# Patient Record
Sex: MALE | Race: WHITE
[De-identification: names, ages, dates, MRNs, and addresses within clinical notes are randomized per-mention and may not be internally consistent; named-entity substitution may affect disease eponyms.]

---

## 2019-09-19 ENCOUNTER — NON-APPOINTMENT (OUTPATIENT)
Age: 64
End: 2019-09-19

## 2019-09-19 ENCOUNTER — APPOINTMENT (OUTPATIENT)
Dept: CARDIOLOGY | Facility: CLINIC | Age: 64
End: 2019-09-19
Payer: MEDICARE

## 2019-09-19 VITALS — SYSTOLIC BLOOD PRESSURE: 174 MMHG | DIASTOLIC BLOOD PRESSURE: 70 MMHG

## 2019-09-19 VITALS — SYSTOLIC BLOOD PRESSURE: 152 MMHG | DIASTOLIC BLOOD PRESSURE: 80 MMHG

## 2019-09-19 VITALS
SYSTOLIC BLOOD PRESSURE: 170 MMHG | HEIGHT: 73 IN | DIASTOLIC BLOOD PRESSURE: 70 MMHG | OXYGEN SATURATION: 98 % | HEART RATE: 70 BPM | BODY MASS INDEX: 26.37 KG/M2 | WEIGHT: 199 LBS

## 2019-09-19 DIAGNOSIS — Z87.891 PERSONAL HISTORY OF NICOTINE DEPENDENCE: ICD-10-CM

## 2019-09-19 DIAGNOSIS — Z82.49 FAMILY HISTORY OF ISCHEMIC HEART DISEASE AND OTHER DISEASES OF THE CIRCULATORY SYSTEM: ICD-10-CM

## 2019-09-19 DIAGNOSIS — K26.4 CHRONIC OR UNSPECIFIED DUODENAL ULCER WITH HEMORRHAGE: ICD-10-CM

## 2019-09-19 PROCEDURE — 93000 ELECTROCARDIOGRAM COMPLETE: CPT

## 2019-09-19 PROCEDURE — 99205 OFFICE O/P NEW HI 60 MIN: CPT

## 2019-09-19 RX ORDER — TAMSULOSIN HYDROCHLORIDE 0.4 MG/1
0.4 CAPSULE ORAL DAILY
Qty: 90 | Refills: 3 | Status: ACTIVE | COMMUNITY

## 2019-09-19 RX ORDER — ATORVASTATIN CALCIUM 20 MG/1
20 TABLET, FILM COATED ORAL
Qty: 90 | Refills: 3 | Status: ACTIVE | COMMUNITY

## 2019-09-19 RX ORDER — LOSARTAN POTASSIUM AND HYDROCHLOROTHIAZIDE 25; 100 MG/1; MG/1
100-25 TABLET ORAL DAILY
Qty: 90 | Refills: 3 | Status: ACTIVE | COMMUNITY

## 2019-09-19 RX ORDER — METOPROLOL SUCCINATE 50 MG/1
50 TABLET, EXTENDED RELEASE ORAL
Qty: 90 | Refills: 2 | Status: ACTIVE | COMMUNITY

## 2019-09-19 RX ORDER — ASPIRIN 81 MG
81 TABLET, DELAYED RELEASE (ENTERIC COATED) ORAL
Refills: 0 | Status: ACTIVE | COMMUNITY

## 2019-09-19 RX ORDER — OMEPRAZOLE 40 MG/1
40 CAPSULE, DELAYED RELEASE ORAL
Refills: 0 | Status: ACTIVE | COMMUNITY

## 2019-09-19 RX ORDER — AMLODIPINE BESYLATE 10 MG/1
10 TABLET ORAL
Qty: 90 | Refills: 2 | Status: ACTIVE | COMMUNITY

## 2019-09-19 NOTE — PHYSICAL EXAM
[General Appearance - Well Developed] : well developed [Normal Appearance] : normal appearance [Well Groomed] : well groomed [General Appearance - Well Nourished] : well nourished [No Deformities] : no deformities [General Appearance - In No Acute Distress] : no acute distress [Normal Conjunctiva] : the conjunctiva exhibited no abnormalities [Eyelids - No Xanthelasma] : the eyelids demonstrated no xanthelasmas [Normal Oral Mucosa] : normal oral mucosa [No Oral Pallor] : no oral pallor [No Oral Cyanosis] : no oral cyanosis [Normal Jugular Venous A Waves Present] : normal jugular venous A waves present [Normal Jugular Venous V Waves Present] : normal jugular venous V waves present [No Jugular Venous Ford A Waves] : no jugular venous frod A waves [Heart Rate And Rhythm] : heart rate and rhythm were normal [Heart Sounds] : normal S1 and S2 [Murmurs] : no murmurs present [Edema] : no peripheral edema present [Right Carotid Bruit] : no bruit heard over the right carotid [Left Carotid Bruit] : no bruit heard over the left carotid [1+] : left 1+ [Bruit] : no bruit heard [Respiration, Rhythm And Depth] : normal respiratory rhythm and effort [Exaggerated Use Of Accessory Muscles For Inspiration] : no accessory muscle use [Auscultation Breath Sounds / Voice Sounds] : lungs were clear to auscultation bilaterally [Bowel Sounds] : normal bowel sounds [Abdomen Soft] : soft [Abdomen Tenderness] : non-tender [Abnormal Walk] : normal gait [Gait - Sufficient For Exercise Testing] : the gait was sufficient for exercise testing [Nail Clubbing] : no clubbing of the fingernails [Cyanosis, Localized] : no localized cyanosis [Petechial Hemorrhages (___cm)] : no petechial hemorrhages [Skin Color & Pigmentation] : normal skin color and pigmentation [] : no rash [No Venous Stasis] : no venous stasis [Skin Lesions] : no skin lesions [No Skin Ulcers] : no skin ulcer [No Xanthoma] : no  xanthoma was observed [Oriented To Time, Place, And Person] : oriented to person, place, and time [Affect] : the affect was normal [Mood] : the mood was normal [Memory Recent] : recent memory was not impaired [No Anxiety] : not feeling anxious

## 2019-09-19 NOTE — REASON FOR VISIT
[Initial Evaluation] : an initial evaluation of [Abnormal ECG] : an abnormal ECG [Coronary Artery Disease] : coronary artery disease [Hyperlipidemia] : hyperlipidemia [Hypertension] : hypertension

## 2019-09-19 NOTE — HISTORY OF PRESENT ILLNESS
[FreeTextEntry1] : Hernán is a pleasant 64-year-old gentleman with history of hypertension, BPH, acute coronary syndrome status post medicated drug stent to his RCA placed in 2012, type 2 diabetes with improved blood sugar values eating healthy and feeling generally well comes over to establish cardiovascular care. He was previously seen by cardiologist in [facility] and reportedly last round of cardiac testing occurred in the November 2018 reportedly within acceptable limits. He has an element of white collar hypertension but generally his blood pressures at home he reports around 148 mmHg systolic BP. No current chest complaints feeling generally fine.

## 2019-09-19 NOTE — DISCUSSION/SUMMARY
[___ Month(s)] : [unfilled] month(s) [FreeTextEntry1] : I have made no changes to his medication regimen and he'll continue on aspirin daily for heart risk reduction along with his current antihypertensive and lipid-lowering strategies. He will remain on diltiazem, amlodipine, losartan/HCTZ and metoprolol for blood pressure control along with atorvastatin for lipid-lowering. I asked that he sign of medical release to have prior testing sent over. Recent labs are reviewed and his hemoglobin A1c appears at goal along with his fasting lipid profile. He is motivated to do have improved hemoglobin A1c still and hopefully lower his dose of metformin. He will start a exercise regimen shortly. I recommended a heart healthy lifestyle including a low-saturated fat, low cholesterol diet with improved aerobic physical fitness over time for cardiovascular benefits. Carbohydrate restriction and weight management over time also encouraged. He will see you for care and followup with me in about 6 months or sooner if needed.

## 2020-03-19 ENCOUNTER — APPOINTMENT (OUTPATIENT)
Dept: CARDIOLOGY | Facility: CLINIC | Age: 65
End: 2020-03-19

## 2021-02-25 ENCOUNTER — NON-APPOINTMENT (OUTPATIENT)
Age: 66
End: 2021-02-25

## 2021-02-25 ENCOUNTER — APPOINTMENT (OUTPATIENT)
Dept: CARDIOLOGY | Facility: CLINIC | Age: 66
End: 2021-02-25
Payer: MEDICARE

## 2021-02-25 VITALS
DIASTOLIC BLOOD PRESSURE: 80 MMHG | WEIGHT: 197 LBS | HEART RATE: 81 BPM | OXYGEN SATURATION: 98 % | TEMPERATURE: 98.2 F | SYSTOLIC BLOOD PRESSURE: 180 MMHG | HEIGHT: 73 IN | BODY MASS INDEX: 26.11 KG/M2

## 2021-02-25 DIAGNOSIS — R09.89 OTHER SPECIFIED SYMPTOMS AND SIGNS INVOLVING THE CIRCULATORY AND RESPIRATORY SYSTEMS: ICD-10-CM

## 2021-02-25 PROCEDURE — 99072 ADDL SUPL MATRL&STAF TM PHE: CPT

## 2021-02-25 PROCEDURE — 93000 ELECTROCARDIOGRAM COMPLETE: CPT

## 2021-02-25 PROCEDURE — 99215 OFFICE O/P EST HI 40 MIN: CPT

## 2021-02-25 RX ORDER — FINASTERIDE 5 MG/1
5 TABLET, FILM COATED ORAL DAILY
Qty: 90 | Refills: 3 | Status: ACTIVE | COMMUNITY

## 2021-02-25 NOTE — DISCUSSION/SUMMARY
[___ Month(s)] : [unfilled] month(s) [FreeTextEntry1] : He will continue on his current medication regimen for blood pressure control including amlodipine, diltiazem,  losartan/HCTZ, metoprolol at the current strengths and I am raising hydralazine 25 mg 3 times daily.  He will follow his home blood pressure readings.  While he awaits the increased hydralazine dosing from his mail away pharmacy, he can double his current hydralazine 20 mg 3 times daily.  He will continue on Metformin for glycemic lowering in the setting of type 2 diabetes.  Stay on aspirin 81 mg daily and atorvastatin 20 mg daily for lipid lowering.  I have ordered an echocardiogram to assess current LV function and valvular status.  I have also ordered a bilateral carotid Doppler to assess carotid disease burden and serve as a surrogate cardiovascular risk marker.  Left carotid bruit heard on exam which needs to be evaluated as well.  Given the EKG finding of also ordered a 24-hour Holter monitor to assess for dysrhythmia.  We will call him with his test results and he will follow up with you for care.  See me in 6 months or sooner if needed.

## 2021-02-25 NOTE — PHYSICAL EXAM
[General Appearance - Well Developed] : well developed [Normal Appearance] : normal appearance [Well Groomed] : well groomed [General Appearance - Well Nourished] : well nourished [No Deformities] : no deformities [General Appearance - In No Acute Distress] : no acute distress [Normal Conjunctiva] : the conjunctiva exhibited no abnormalities [Eyelids - No Xanthelasma] : the eyelids demonstrated no xanthelasmas [Normal Oral Mucosa] : normal oral mucosa [No Oral Pallor] : no oral pallor [No Oral Cyanosis] : no oral cyanosis [Normal Jugular Venous A Waves Present] : normal jugular venous A waves present [Normal Jugular Venous V Waves Present] : normal jugular venous V waves present [No Jugular Venous Ford A Waves] : no jugular venous ford A waves [Respiration, Rhythm And Depth] : normal respiratory rhythm and effort [Exaggerated Use Of Accessory Muscles For Inspiration] : no accessory muscle use [Auscultation Breath Sounds / Voice Sounds] : lungs were clear to auscultation bilaterally [Heart Rate And Rhythm] : heart rate and rhythm were normal [Heart Sounds] : normal S1 and S2 [Murmurs] : no murmurs present [Edema] : no peripheral edema present [1+] : left 1+ [Bowel Sounds] : normal bowel sounds [Abdomen Soft] : soft [Abdomen Tenderness] : non-tender [Abnormal Walk] : normal gait [Gait - Sufficient For Exercise Testing] : the gait was sufficient for exercise testing [Nail Clubbing] : no clubbing of the fingernails [Cyanosis, Localized] : no localized cyanosis [Petechial Hemorrhages (___cm)] : no petechial hemorrhages [Skin Color & Pigmentation] : normal skin color and pigmentation [] : no rash [No Venous Stasis] : no venous stasis [Skin Lesions] : no skin lesions [No Skin Ulcers] : no skin ulcer [No Xanthoma] : no  xanthoma was observed [Oriented To Time, Place, And Person] : oriented to person, place, and time [Affect] : the affect was normal [Mood] : the mood was normal [Memory Recent] : recent memory was not impaired [No Anxiety] : not feeling anxious [Right Carotid Bruit] : no bruit heard over the right carotid [Left Carotid Bruit] : left carotid bruit heard [Bruit] : no bruit heard

## 2021-02-25 NOTE — HISTORY OF PRESENT ILLNESS
[FreeTextEntry1] : Hernán is 65 years of age with hypertension, CAD and prior PCI in 2012, type 2 diabetes, comes to the office today for cardiac checkup feeling generally well with difficulty with blood pressure readings lately.  He has had addition of hydralazine with some benefit but still is concerned.  No current chest pains, shortness of breath, palpitations, syncope or edema noted.  He reports eating generally healthy.  He takes some supplements as well.  His ECG today demonstrates sinus rhythm with what appears to be ectopic atrial rhythm at times/APCs and left anterior hemiblock.

## 2021-04-27 ENCOUNTER — APPOINTMENT (OUTPATIENT)
Dept: CARDIOLOGY | Facility: CLINIC | Age: 66
End: 2021-04-27
Payer: MEDICARE

## 2021-04-27 PROCEDURE — 93880 EXTRACRANIAL BILAT STUDY: CPT

## 2021-04-27 PROCEDURE — 93306 TTE W/DOPPLER COMPLETE: CPT

## 2021-04-27 PROCEDURE — 99072 ADDL SUPL MATRL&STAF TM PHE: CPT

## 2022-06-02 ENCOUNTER — NON-APPOINTMENT (OUTPATIENT)
Age: 67
End: 2022-06-02

## 2022-06-02 ENCOUNTER — APPOINTMENT (OUTPATIENT)
Dept: CARDIOLOGY | Facility: CLINIC | Age: 67
End: 2022-06-02
Payer: MEDICARE

## 2022-06-02 VITALS
SYSTOLIC BLOOD PRESSURE: 166 MMHG | BODY MASS INDEX: 24.65 KG/M2 | HEIGHT: 73 IN | WEIGHT: 186 LBS | OXYGEN SATURATION: 97 % | HEART RATE: 67 BPM | DIASTOLIC BLOOD PRESSURE: 80 MMHG

## 2022-06-02 PROCEDURE — 99214 OFFICE O/P EST MOD 30 MIN: CPT

## 2022-06-02 PROCEDURE — 93000 ELECTROCARDIOGRAM COMPLETE: CPT

## 2022-06-02 RX ORDER — HYDRALAZINE HYDROCHLORIDE 10 MG/1
10 TABLET ORAL 3 TIMES DAILY
Qty: 270 | Refills: 3 | Status: DISCONTINUED | COMMUNITY
End: 2022-06-02

## 2022-06-02 RX ORDER — METFORMIN HYDROCHLORIDE 1000 MG/1
1000 TABLET, COATED ORAL
Refills: 0 | Status: ACTIVE | COMMUNITY

## 2022-06-02 NOTE — PHYSICAL EXAM
[General Appearance - Well Developed] : well developed [Normal Appearance] : normal appearance [Well Groomed] : well groomed [General Appearance - Well Nourished] : well nourished [No Deformities] : no deformities [General Appearance - In No Acute Distress] : no acute distress [Normal Conjunctiva] : the conjunctiva exhibited no abnormalities [Eyelids - No Xanthelasma] : the eyelids demonstrated no xanthelasmas [Normal Oral Mucosa] : normal oral mucosa [No Oral Pallor] : no oral pallor [No Oral Cyanosis] : no oral cyanosis [Normal Jugular Venous A Waves Present] : normal jugular venous A waves present [Normal Jugular Venous V Waves Present] : normal jugular venous V waves present [No Jugular Venous Ford A Waves] : no jugular venous ford A waves [Respiration, Rhythm And Depth] : normal respiratory rhythm and effort [Exaggerated Use Of Accessory Muscles For Inspiration] : no accessory muscle use [Auscultation Breath Sounds / Voice Sounds] : lungs were clear to auscultation bilaterally [Heart Rate And Rhythm] : heart rate and rhythm were normal [Heart Sounds] : normal S1 and S2 [Murmurs] : no murmurs present [Bowel Sounds] : normal bowel sounds [Abdomen Soft] : soft [Abdomen Tenderness] : non-tender [Abnormal Walk] : normal gait [Gait - Sufficient For Exercise Testing] : the gait was sufficient for exercise testing [Nail Clubbing] : no clubbing of the fingernails [Cyanosis, Localized] : no localized cyanosis [Petechial Hemorrhages (___cm)] : no petechial hemorrhages [Skin Color & Pigmentation] : normal skin color and pigmentation [] : no rash [No Venous Stasis] : no venous stasis [Skin Lesions] : no skin lesions [No Skin Ulcers] : no skin ulcer [No Xanthoma] : no  xanthoma was observed [Oriented To Time, Place, And Person] : oriented to person, place, and time [Affect] : the affect was normal [Mood] : the mood was normal [No Anxiety] : not feeling anxious

## 2022-06-03 ENCOUNTER — APPOINTMENT (OUTPATIENT)
Dept: CARDIOLOGY | Facility: CLINIC | Age: 67
End: 2022-06-03
Payer: MEDICARE

## 2022-06-03 PROCEDURE — 93306 TTE W/DOPPLER COMPLETE: CPT

## 2022-06-06 ENCOUNTER — MED ADMIN CHARGE (OUTPATIENT)
Age: 67
End: 2022-06-06

## 2022-06-06 ENCOUNTER — APPOINTMENT (OUTPATIENT)
Dept: CARDIOLOGY | Facility: CLINIC | Age: 67
End: 2022-06-06
Payer: MEDICARE

## 2022-06-06 PROCEDURE — 93015 CV STRESS TEST SUPVJ I&R: CPT

## 2022-06-06 PROCEDURE — 78452 HT MUSCLE IMAGE SPECT MULT: CPT

## 2022-06-06 PROCEDURE — A9500: CPT

## 2022-06-06 RX ORDER — REGADENOSON 0.08 MG/ML
0.4 INJECTION, SOLUTION INTRAVENOUS
Qty: 1 | Refills: 0 | Status: COMPLETED | OUTPATIENT
Start: 2022-06-06

## 2022-06-06 RX ADMIN — REGADENOSON 4 MG/5ML: 0.08 INJECTION, SOLUTION INTRAVENOUS at 00:00

## 2022-06-06 NOTE — HISTORY OF PRESENT ILLNESS
[Preoperative Visit] : for a medical evaluation prior to surgery [Scheduled Procedure ___] : a [unfilled] [Surgeon Name ___] : surgeon: [unfilled] [de-identified] : [phone] [FreeTextEntry1] : Hernán is a pleasant 66-year-old with dyslipidemia, hypertension, type 2 diabetes, GERD, known CAD and previous PCI in 2012 comes to the office for cardiac checkup and preoperative cardiovascular assessment and clearance for upcoming thoracic lung surgery and what sounds like lobectomy.  He tells me he had recent LFT abnormalities on blood work which led to multiple full body imaging scans leading to what sounds like a neuroendocrine type tumor.  He reports that he plays golf and walks 9 holes daily now without current chest symptoms.  I reviewed available detail from [facility] revealing patches of groundglass attenuation noted in the anterior right lung apex measuring 2.6 x 2 cm and in the anterior left upper lobe measuring 8 mm.  He is also seen to have multifocal hepatic tumors consistent with hepatocellular carcinoma further radiographic data.  According to the notes this is consistent with right upper lobe nodule and large hepatic mass.

## 2022-06-06 NOTE — ADDENDUM
[FreeTextEntry1] : As part of his preoperative work-up, Hernán underwent echocardiography in our office on 6/3/2022 revealing normal left ventricular wall thickness, normal size, and systolic function with EF 60 to 65%, mildly dilated left atrial size, grade 1 diastolic dysfunction, and no significant valvular heart disease.  He underwent nuclear stress test on 6/6/2022 revealing normal myocardial perfusion and systolic function and no evidence of ischemia or scar.  Based on his current clinical and cardiac status, he may proceed ahead with his upcoming thoracic surgery, VATS/lung lobectomy with routine hemodynamic monitoring perioperatively.

## 2023-01-05 RX ORDER — DILTIAZEM HYDROCHLORIDE 360 MG/1
360 CAPSULE, EXTENDED RELEASE ORAL DAILY
Qty: 90 | Refills: 3 | Status: DISCONTINUED | COMMUNITY
End: 2023-01-05

## 2023-12-27 ENCOUNTER — APPOINTMENT (OUTPATIENT)
Dept: CARDIOLOGY | Facility: CLINIC | Age: 68
End: 2023-12-27
Payer: MEDICARE

## 2023-12-27 ENCOUNTER — NON-APPOINTMENT (OUTPATIENT)
Age: 68
End: 2023-12-27

## 2023-12-27 VITALS
SYSTOLIC BLOOD PRESSURE: 150 MMHG | HEART RATE: 58 BPM | DIASTOLIC BLOOD PRESSURE: 80 MMHG | OXYGEN SATURATION: 97 % | BODY MASS INDEX: 25.98 KG/M2 | HEIGHT: 73 IN | WEIGHT: 196 LBS

## 2023-12-27 DIAGNOSIS — Z01.810 ENCOUNTER FOR PREPROCEDURAL CARDIOVASCULAR EXAMINATION: ICD-10-CM

## 2023-12-27 DIAGNOSIS — R94.31 ABNORMAL ELECTROCARDIOGRAM [ECG] [EKG]: ICD-10-CM

## 2023-12-27 PROCEDURE — 93000 ELECTROCARDIOGRAM COMPLETE: CPT

## 2023-12-27 PROCEDURE — 99214 OFFICE O/P EST MOD 30 MIN: CPT

## 2023-12-27 NOTE — PHYSICAL EXAM
[General Appearance - Well Developed] : well developed [Normal Appearance] : normal appearance [Well Groomed] : well groomed [General Appearance - Well Nourished] : well nourished [No Deformities] : no deformities [General Appearance - In No Acute Distress] : no acute distress [Normal Jugular Venous A Waves Present] : normal jugular venous A waves present [Normal Jugular Venous V Waves Present] : normal jugular venous V waves present [No Jugular Venous Ford A Waves] : no jugular venous ford A waves [Respiration, Rhythm And Depth] : normal respiratory rhythm and effort [Exaggerated Use Of Accessory Muscles For Inspiration] : no accessory muscle use [Auscultation Breath Sounds / Voice Sounds] : lungs were clear to auscultation bilaterally [Heart Rate And Rhythm] : heart rate and rhythm were normal [Heart Sounds] : normal S1 and S2 [Murmurs] : no murmurs present [Abnormal Walk] : normal gait [Gait - Sufficient For Exercise Testing] : the gait was sufficient for exercise testing [Nail Clubbing] : no clubbing of the fingernails [Cyanosis, Localized] : no localized cyanosis [Petechial Hemorrhages (___cm)] : no petechial hemorrhages [] : no ischemic changes [Oriented To Time, Place, And Person] : oriented to person, place, and time [Affect] : the affect was normal [Mood] : the mood was normal [No Anxiety] : not feeling anxious

## 2024-01-23 NOTE — DISCUSSION/SUMMARY
[Procedure Low Risk] : the procedure risk is low [Patient Low Risk] : the patient is a low surgical risk [Optimized for Surgery] : the patient is optimized for surgery [Continue] : Continue medications as currently directed [FreeTextEntry1] : He will continue on aspirin 81 mg daily and atorvastatin 20 mg daily for CAD management and lipid-lowering.  Continue current antihypertensive regimen of hydralazine, diltiazem, losartan HCTZ, and metoprolol.  Continue glycemic reduction with metformin.  Although it sounds like he has good functional capacity and no current chest symptoms, he does have significant traditional cardiovascular risk factors and an abnormal ECG.  He is a former tobacco smoker.   I recommended a heart healthy lifestyle including a low-saturated fat, low cholesterol diet with improved aerobic physical fitness over time for cardiovascular benefits.  Carbohydrate and sodium restriction along with weight loss over time encouraged.  An addendum to this note regarding fitness for surgery will be added pending the results of his upcoming studies.  Follow-up with in 3 months.

## 2024-01-23 NOTE — HISTORY OF PRESENT ILLNESS
[Preoperative Visit] : for a medical evaluation prior to surgery [Scheduled Procedure ___] : a [unfilled] [Surgeon Name ___] : surgeon: [unfilled] [Stable] : Stable [Electrocardiogram] : ~T an ECG ~C was performed [Date of Surgery ___] : on [unfilled] [Fever] : no fever [Chills] : no chills [Fatigue] : no fatigue [Chest Pain] : no chest pain [Cough] : no cough [Dyspnea] : no dyspnea [FreeTextEntry1] : Hernán is a pleasant 68-year-old with dyslipidemia, hypertension, type 2 diabetes, GERD, known CAD and previous PCI in 2012 comes to the office for cardiac checkup and preoperative cardiovascular assessment and clearance cataract surgery.  He reports that he plays golf and walks 9 holes daily now without current chest symptoms.  I reviewed available detail from [facility] revealing patches of ground-glass attenuation noted in the anterior right lung apex measuring 2.6 x 2 cm and in the anterior left upper lobe measuring 8 mm.  He is also seen to have multifocal hepatic tumors consistent with hepatocellular carcinoma further radiographic data.  According to the notes this is consistent with right upper lobe nodule and large hepatic mass.  12/27/2023: Patient is asymptomatic and has had no interval complaints since his last visit. Patient denies CP, SOB, Palpitations, Lightheadedness, Syncope. Patient next follow up at [facility] is first week of February.

## 2024-03-18 ENCOUNTER — APPOINTMENT (OUTPATIENT)
Dept: CARDIOLOGY | Facility: CLINIC | Age: 69
End: 2024-03-18
Payer: MEDICARE

## 2024-03-18 VITALS
SYSTOLIC BLOOD PRESSURE: 140 MMHG | HEIGHT: 73 IN | OXYGEN SATURATION: 97 % | BODY MASS INDEX: 25.45 KG/M2 | WEIGHT: 192 LBS | DIASTOLIC BLOOD PRESSURE: 70 MMHG | HEART RATE: 56 BPM

## 2024-03-18 DIAGNOSIS — I10 ESSENTIAL (PRIMARY) HYPERTENSION: ICD-10-CM

## 2024-03-18 DIAGNOSIS — Z86.79 PERSONAL HISTORY OF OTHER DISEASES OF THE CIRCULATORY SYSTEM: ICD-10-CM

## 2024-03-18 DIAGNOSIS — I49.1 ATRIAL PREMATURE DEPOLARIZATION: ICD-10-CM

## 2024-03-18 PROCEDURE — G2211 COMPLEX E/M VISIT ADD ON: CPT

## 2024-03-18 PROCEDURE — 99214 OFFICE O/P EST MOD 30 MIN: CPT

## 2024-03-18 RX ORDER — CLONIDINE HYDROCHLORIDE 0.1 MG/1
0.1 TABLET ORAL TWICE DAILY
Qty: 180 | Refills: 3 | Status: ACTIVE | COMMUNITY
Start: 2024-03-18 | End: 1900-01-01

## 2024-03-18 RX ORDER — HYDRALAZINE HYDROCHLORIDE 25 MG/1
25 TABLET ORAL 3 TIMES DAILY
Qty: 90 | Refills: 6 | Status: DISCONTINUED | COMMUNITY
Start: 2021-02-25 | End: 2024-03-18

## 2024-03-18 NOTE — DISCUSSION/SUMMARY
[___ Month(s)] : in [unfilled] month(s) [With ___] : with [unfilled] [FreeTextEntry1] : Continued suboptimal hypertensive control; may be due to combination of ongoing chemotherapy and other medical issues, also some element of whitecoat hypertension.  Self measured blood pressures appear elevated, noncompliant with 3 times daily hydralazine; will change to twice daily clonidine low-dose.  Should should self record blood pressures, will call in 4 to 5 weeks to get list of his blood pressures and follow-up with nurse practitioner in 3 to 4 months for continued optimization of antihypertensive therapy.  I recommended a heart healthy lifestyle including a low-saturated fat, low cholesterol diet with improved aerobic physical fitness over time for cardiovascular benefits.  Carbohydrate and sodium restriction along with weight loss over time encouraged.

## 2024-03-18 NOTE — CARDIOLOGY SUMMARY
[___] : [unfilled] [de-identified] : 6/6/2022 Ex SPECT MPI , normal myocardial perfusion and systolic function and no evidence of ischemia or scar.  [de-identified] : 6/3/2022 revealing normal left ventricular wall thickness, normal size, and systolic function with EF 60 to 65%, mildly dilated left atrial size, grade 1 diastolic dysfunction, and no significant valvular heart disease.

## 2024-03-18 NOTE — HISTORY OF PRESENT ILLNESS
[FreeTextEntry1] : 68 year-old with dyslipidemia, hypertension, type 2 diabetes, GERD, known CAD (s/p status post medicated drug stent to his RCA placed in 2012).  I reviewed available detail from [facility] revealing patches of ground glass attenuation noted in the anterior right lung apex measuring 2.6 x 2 cm and in the anterior left upper lobe measuring 8 mm.  He is also seen to have multifocal hepatic tumors consistent with hepatocellular carcinoma further radiographic data.  According to the notes this is consistent with right upper lobe nodule and large hepatic mass. He is a former tobacco smoker.   Here for routine cardiac checkup, states his self-measured BP's in 130-140's. Admittedly not compliant with 3x daily Hydralazine.  Walks dog daily ([employer] [employer]), still feels nauseous at times form chemotherapy. Had cataract surgery but still having blurriness in one eye.

## 2024-07-21 ENCOUNTER — NON-APPOINTMENT (OUTPATIENT)
Age: 69
End: 2024-07-21

## 2024-07-22 ENCOUNTER — APPOINTMENT (OUTPATIENT)
Dept: CARDIOLOGY | Facility: CLINIC | Age: 69
End: 2024-07-22
Payer: MEDICARE

## 2024-07-22 ENCOUNTER — NON-APPOINTMENT (OUTPATIENT)
Age: 69
End: 2024-07-22

## 2024-07-22 VITALS — DIASTOLIC BLOOD PRESSURE: 70 MMHG | SYSTOLIC BLOOD PRESSURE: 130 MMHG

## 2024-07-22 VITALS
DIASTOLIC BLOOD PRESSURE: 78 MMHG | RESPIRATION RATE: 16 BRPM | SYSTOLIC BLOOD PRESSURE: 140 MMHG | BODY MASS INDEX: 25.58 KG/M2 | HEART RATE: 55 BPM | WEIGHT: 193 LBS | HEIGHT: 73 IN | OXYGEN SATURATION: 98 %

## 2024-07-22 DIAGNOSIS — E78.5 HYPERLIPIDEMIA, UNSPECIFIED: ICD-10-CM

## 2024-07-22 DIAGNOSIS — I10 ESSENTIAL (PRIMARY) HYPERTENSION: ICD-10-CM

## 2024-07-22 DIAGNOSIS — R94.31 ABNORMAL ELECTROCARDIOGRAM [ECG] [EKG]: ICD-10-CM

## 2024-07-22 PROCEDURE — 93000 ELECTROCARDIOGRAM COMPLETE: CPT

## 2024-07-22 PROCEDURE — 99214 OFFICE O/P EST MOD 30 MIN: CPT

## 2024-07-22 PROCEDURE — G2211 COMPLEX E/M VISIT ADD ON: CPT

## 2024-07-22 NOTE — HISTORY OF PRESENT ILLNESS
[FreeTextEntry1] : 68 year-old with dyslipidemia, hypertension, type 2 diabetes, GERD, known CAD (s/p status post medicated drug stent to his RCA placed in 2012).  I reviewed available detail from [facility] revealing patches of ground glass attenuation noted in the anterior right lung apex measuring 2.6 x 2 cm and in the anterior left upper lobe measuring 8 mm.  He is also seen to have multifocal hepatic tumors consistent with hepatocellular carcinoma further radiographic data.  According to the notes this is consistent with right upper lobe nodule and large hepatic mass. He is a former tobacco smoker.   Here for routine cardiac checkup, states his self-measured BP's in 130-140's. Admittedly not compliant with 3x daily Hydralazine.  Walks dog daily ([employer] [employer]), still feels nauseous at times form chemotherapy. Had cataract surgery but still having blurriness in one eye.

## 2024-07-22 NOTE — HISTORY OF PRESENT ILLNESS
[FreeTextEntry1] : 68 year-old with dyslipidemia, hypertension, type 2 diabetes, GERD, known CAD (s/p status post medicated drug stent to his RCA placed in 2012).  I reviewed available detail from [facility] revealing patches of ground glass attenuation noted in the anterior right lung apex measuring 2.6 x 2 cm and in the anterior left upper lobe measuring 8 mm.  He is also seen to have multifocal hepatic tumors consistent with hepatocellular carcinoma further radiographic data.  According to the notes this is consistent with right upper lobe nodule and large hepatic mass. He is a former tobacco smoker.   Here for routine cardiac checkup, states his self-measured BP's in 130-140's. Admittedly not compliant with 3x daily Hydralazine.  Walks dog daily ([employer] [employer]), still feels nauseous at times form chemotherapy. Had cataract surgery but still having blurriness in one eye.

## 2024-07-22 NOTE — DISCUSSION/SUMMARY
[___ Month(s)] : in [unfilled] month(s) [With ___] : with [unfilled] [FreeTextEntry1] : Continued with hypertensive regimen.  Self measured blood pressures appear normotensive, noncompliant with 3 times daily hydralazine; will change to twice daily clonidine low-dose.  Should self record blood pressures. Currently on Chemotherapy agents for Liver CA uncertain of the name of the medication today. Patient was ordered for interval TTE during chemotherapy rest phase will also provide name of medication during echo so proper documentation can be completed  I recommended a heart healthy lifestyle including a low-saturated fat, low cholesterol diet with improved aerobic physical fitness over time for cardiovascular benefits.  Carbohydrate and sodium restriction along with weight loss over time encouraged.   [EKG obtained to assist in diagnosis and management of assessed problem(s)] : EKG obtained to assist in diagnosis and management of assessed problem(s)

## 2024-07-22 NOTE — PHYSICAL EXAM
[Well Developed] : well developed [Well Nourished] : well nourished [No Acute Distress] : no acute distress [Normal S1, S2] : normal S1, S2 [No Murmur] : no murmur [No Rub] : no rub [No Gallop] : no gallop [Clear Lung Fields] : clear lung fields [Good Air Entry] : good air entry [No Respiratory Distress] : no respiratory distress  [No Edema] : no edema [No Cyanosis] : no cyanosis [No Clubbing] : no clubbing [No Varicosities] : no varicosities [Moves all extremities] : moves all extremities [No Focal Deficits] : no focal deficits [Normal Speech] : normal speech [Normal] : alert and oriented, normal memory [Alert and Oriented] : alert and oriented [Normal memory] : normal memory

## 2024-07-22 NOTE — CARDIOLOGY SUMMARY
[de-identified] : 07/22/2024: Sinus Bradycardia -First degree A-V block  Maulik = 272 -Nonspecific QRS widening. -Poor R-wave progression -nonspecific -consider old anterior infarct. [de-identified] : 6/6/2022 Ex SPECT MPI , normal myocardial perfusion and systolic function and no evidence of ischemia or scar.  [de-identified] : 6/3/2022 revealing normal left ventricular wall thickness, normal size, and systolic function with EF 60 to 65%, mildly dilated left atrial size, grade 1 diastolic dysfunction, and no significant valvular heart disease.  Interval Echo ordered

## 2024-07-22 NOTE — REASON FOR VISIT
[CV Risk Factors and Non-Cardiac Disease] : CV risk factors and non-cardiac disease [Arrhythmia/ECG Abnorrmalities] : arrhythmia/ECG abnormalities [Structural Heart and Valve Disease] : structural heart and valve disease [Hypertension] : hypertension

## 2024-07-22 NOTE — CARDIOLOGY SUMMARY
[de-identified] : 07/22/2024: Sinus Bradycardia -First degree A-V block  Maulik = 272 -Nonspecific QRS widening. -Poor R-wave progression -nonspecific -consider old anterior infarct. [de-identified] : 6/6/2022 Ex SPECT MPI , normal myocardial perfusion and systolic function and no evidence of ischemia or scar.  [de-identified] : 6/3/2022 revealing normal left ventricular wall thickness, normal size, and systolic function with EF 60 to 65%, mildly dilated left atrial size, grade 1 diastolic dysfunction, and no significant valvular heart disease.  Interval Echo ordered

## 2024-08-06 ENCOUNTER — APPOINTMENT (OUTPATIENT)
Dept: CARDIOLOGY | Facility: CLINIC | Age: 69
End: 2024-08-06

## 2024-08-06 PROCEDURE — 93356 MYOCRD STRAIN IMG SPCKL TRCK: CPT

## 2024-08-06 PROCEDURE — 93306 TTE W/DOPPLER COMPLETE: CPT

## 2025-01-27 ENCOUNTER — APPOINTMENT (OUTPATIENT)
Dept: CARDIOLOGY | Facility: CLINIC | Age: 70
End: 2025-01-27
Payer: MEDICARE

## 2025-01-27 ENCOUNTER — NON-APPOINTMENT (OUTPATIENT)
Age: 70
End: 2025-01-27

## 2025-01-27 VITALS — SYSTOLIC BLOOD PRESSURE: 138 MMHG | DIASTOLIC BLOOD PRESSURE: 80 MMHG

## 2025-01-27 VITALS
WEIGHT: 205 LBS | HEART RATE: 66 BPM | OXYGEN SATURATION: 98 % | HEIGHT: 73 IN | DIASTOLIC BLOOD PRESSURE: 80 MMHG | SYSTOLIC BLOOD PRESSURE: 146 MMHG | BODY MASS INDEX: 27.17 KG/M2

## 2025-01-27 DIAGNOSIS — I10 ESSENTIAL (PRIMARY) HYPERTENSION: ICD-10-CM

## 2025-01-27 DIAGNOSIS — E78.5 HYPERLIPIDEMIA, UNSPECIFIED: ICD-10-CM

## 2025-01-27 DIAGNOSIS — I25.10 ATHEROSCLEROTIC HEART DISEASE OF NATIVE CORONARY ARTERY W/OUT ANGINA PECTORIS: ICD-10-CM

## 2025-01-27 PROCEDURE — G2211 COMPLEX E/M VISIT ADD ON: CPT

## 2025-01-27 PROCEDURE — 99214 OFFICE O/P EST MOD 30 MIN: CPT

## 2025-01-27 PROCEDURE — 93000 ELECTROCARDIOGRAM COMPLETE: CPT

## 2025-02-24 ENCOUNTER — RX RENEWAL (OUTPATIENT)
Age: 70
End: 2025-02-24

## 2025-03-27 NOTE — DISCUSSION/SUMMARY
Message left asking patient if able to come in at 2p on 3-27-25 for appt.     Patient advised to call [phone] if able to come in earlier.    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  He will be undergoing right VATS lobectomy on 6/10/2022 and local therapy to his liver lesion at a later date. He will continue on aspirin 81 mg daily and atorvastatin 20 mg daily for CAD management and lipid-lowering.  Continue current antihypertensive regimen of hydralazine, diltiazem, losartan HCTZ, and metoprolol.  Continue glycemic reduction with metformin.  Although it sounds like he has good functional capacity and no current chest symptoms, he does have significant traditional cardiovascular risk factors and an abnormal ECG.  He is a former tobacco smoker.  To further cardiac risk stratify, I have ordered an echocardiogram to assess LV function and valvular status.  To assess underlying coronary disease burden with current cardiovascular disease risk factors and symptoms, I have ordered a nuclear perfusion stress test.  I recommended a heart healthy lifestyle including a low-saturated fat, low cholesterol diet with improved aerobic physical fitness over time for cardiovascular benefits.  Carbohydrate and sodium restriction along with weight loss over time encouraged.  An addendum to this note regarding fitness for surgery will be added pending the results of his upcoming studies.  Follow-up with you for care.

## 2025-07-19 ENCOUNTER — NON-APPOINTMENT (OUTPATIENT)
Age: 70
End: 2025-07-19

## 2025-07-21 ENCOUNTER — APPOINTMENT (OUTPATIENT)
Dept: PULMONOLOGY | Facility: CLINIC | Age: 70
End: 2025-07-21
Payer: MEDICARE

## 2025-07-21 PROCEDURE — 94729 DIFFUSING CAPACITY: CPT

## 2025-07-21 PROCEDURE — 94010 BREATHING CAPACITY TEST: CPT

## 2025-07-21 PROCEDURE — 94727 GAS DIL/WSHOT DETER LNG VOL: CPT

## 2025-07-23 ENCOUNTER — NON-APPOINTMENT (OUTPATIENT)
Age: 70
End: 2025-07-23

## 2025-07-23 ENCOUNTER — APPOINTMENT (OUTPATIENT)
Dept: CARDIOLOGY | Facility: CLINIC | Age: 70
End: 2025-07-23
Payer: MEDICARE

## 2025-07-23 VITALS
SYSTOLIC BLOOD PRESSURE: 150 MMHG | WEIGHT: 194 LBS | HEART RATE: 76 BPM | DIASTOLIC BLOOD PRESSURE: 80 MMHG | OXYGEN SATURATION: 97 % | BODY MASS INDEX: 25.71 KG/M2 | HEIGHT: 73 IN

## 2025-07-23 VITALS — DIASTOLIC BLOOD PRESSURE: 80 MMHG | SYSTOLIC BLOOD PRESSURE: 140 MMHG

## 2025-07-23 DIAGNOSIS — E78.5 HYPERLIPIDEMIA, UNSPECIFIED: ICD-10-CM

## 2025-07-23 DIAGNOSIS — Z01.810 ENCOUNTER FOR PREPROCEDURAL CARDIOVASCULAR EXAMINATION: ICD-10-CM

## 2025-07-23 DIAGNOSIS — I25.10 ATHEROSCLEROTIC HEART DISEASE OF NATIVE CORONARY ARTERY W/OUT ANGINA PECTORIS: ICD-10-CM

## 2025-07-23 DIAGNOSIS — I10 ESSENTIAL (PRIMARY) HYPERTENSION: ICD-10-CM

## 2025-07-23 PROCEDURE — 99214 OFFICE O/P EST MOD 30 MIN: CPT

## 2025-07-23 PROCEDURE — 93000 ELECTROCARDIOGRAM COMPLETE: CPT

## 2025-07-23 PROCEDURE — G2211 COMPLEX E/M VISIT ADD ON: CPT
